# Patient Record
Sex: MALE | Race: BLACK OR AFRICAN AMERICAN | Employment: PART TIME | URBAN - METROPOLITAN AREA
[De-identification: names, ages, dates, MRNs, and addresses within clinical notes are randomized per-mention and may not be internally consistent; named-entity substitution may affect disease eponyms.]

---

## 2017-03-31 ENCOUNTER — ED HISTORICAL/CONVERTED ENCOUNTER (OUTPATIENT)
Dept: OTHER | Age: 22
End: 2017-03-31

## 2017-11-20 ENCOUNTER — ED HISTORICAL/CONVERTED ENCOUNTER (OUTPATIENT)
Dept: OTHER | Age: 22
End: 2017-11-20

## 2017-11-24 ENCOUNTER — ED HISTORICAL/CONVERTED ENCOUNTER (OUTPATIENT)
Dept: OTHER | Age: 22
End: 2017-11-24

## 2019-06-28 ENCOUNTER — ED HISTORICAL/CONVERTED ENCOUNTER (OUTPATIENT)
Dept: OTHER | Age: 24
End: 2019-06-28

## 2020-01-06 ENCOUNTER — ED HISTORICAL/CONVERTED ENCOUNTER (OUTPATIENT)
Dept: OTHER | Age: 25
End: 2020-01-06

## 2022-05-20 ENCOUNTER — HOSPITAL ENCOUNTER (EMERGENCY)
Age: 27
Discharge: HOME | End: 2022-05-20
Attending: EMERGENCY MEDICINE | Admitting: EMERGENCY MEDICINE
Payer: MEDICAID

## 2022-05-20 ENCOUNTER — APPOINTMENT (OUTPATIENT)
Dept: CT IMAGING | Age: 27
End: 2022-05-20
Attending: NURSE PRACTITIONER
Payer: MEDICAID

## 2022-05-20 VITALS
HEIGHT: 69 IN | DIASTOLIC BLOOD PRESSURE: 66 MMHG | WEIGHT: 186 LBS | OXYGEN SATURATION: 99 % | RESPIRATION RATE: 18 BRPM | HEART RATE: 63 BPM | TEMPERATURE: 98.5 F | SYSTOLIC BLOOD PRESSURE: 132 MMHG | BODY MASS INDEX: 27.55 KG/M2

## 2022-05-20 DIAGNOSIS — J35.1 ENLARGED TONSILS: ICD-10-CM

## 2022-05-20 DIAGNOSIS — K08.89 DENTALGIA: Primary | ICD-10-CM

## 2022-05-20 DIAGNOSIS — J02.9 SORE THROAT: ICD-10-CM

## 2022-05-20 LAB — DEPRECATED S PYO AG THROAT QL EIA: NEGATIVE

## 2022-05-20 PROCEDURE — 87147 CULTURE TYPE IMMUNOLOGIC: CPT

## 2022-05-20 PROCEDURE — 74011250636 HC RX REV CODE- 250/636: Performed by: NURSE PRACTITIONER

## 2022-05-20 PROCEDURE — 87070 CULTURE OTHR SPECIMN AEROBIC: CPT

## 2022-05-20 PROCEDURE — 74011000636 HC RX REV CODE- 636: Performed by: NURSE PRACTITIONER

## 2022-05-20 PROCEDURE — 87880 STREP A ASSAY W/OPTIC: CPT

## 2022-05-20 PROCEDURE — 96374 THER/PROPH/DIAG INJ IV PUSH: CPT

## 2022-05-20 PROCEDURE — 74011250637 HC RX REV CODE- 250/637: Performed by: NURSE PRACTITIONER

## 2022-05-20 PROCEDURE — 70491 CT SOFT TISSUE NECK W/DYE: CPT

## 2022-05-20 PROCEDURE — 96375 TX/PRO/DX INJ NEW DRUG ADDON: CPT

## 2022-05-20 PROCEDURE — 99285 EMERGENCY DEPT VISIT HI MDM: CPT

## 2022-05-20 RX ORDER — KETOROLAC TROMETHAMINE 15 MG/ML
15 INJECTION, SOLUTION INTRAMUSCULAR; INTRAVENOUS
Status: COMPLETED | OUTPATIENT
Start: 2022-05-20 | End: 2022-05-20

## 2022-05-20 RX ORDER — DEXAMETHASONE SODIUM PHOSPHATE 4 MG/ML
10 INJECTION, SOLUTION INTRA-ARTICULAR; INTRALESIONAL; INTRAMUSCULAR; INTRAVENOUS; SOFT TISSUE ONCE
Status: DISCONTINUED | OUTPATIENT
Start: 2022-05-20 | End: 2022-05-20

## 2022-05-20 RX ORDER — PENICILLIN V POTASSIUM 250 MG/1
500 TABLET, FILM COATED ORAL
Status: COMPLETED | OUTPATIENT
Start: 2022-05-20 | End: 2022-05-20

## 2022-05-20 RX ORDER — KETOROLAC TROMETHAMINE 30 MG/ML
30 INJECTION, SOLUTION INTRAMUSCULAR; INTRAVENOUS
Status: DISCONTINUED | OUTPATIENT
Start: 2022-05-20 | End: 2022-05-20

## 2022-05-20 RX ORDER — PENICILLIN V POTASSIUM 500 MG/1
500 TABLET, FILM COATED ORAL 4 TIMES DAILY
Qty: 40 TABLET | Refills: 0 | Status: SHIPPED | OUTPATIENT
Start: 2022-05-20 | End: 2022-05-30

## 2022-05-20 RX ORDER — DEXAMETHASONE SODIUM PHOSPHATE 4 MG/ML
8 INJECTION, SOLUTION INTRA-ARTICULAR; INTRALESIONAL; INTRAMUSCULAR; INTRAVENOUS; SOFT TISSUE ONCE
Status: COMPLETED | OUTPATIENT
Start: 2022-05-20 | End: 2022-05-20

## 2022-05-20 RX ORDER — KETOROLAC TROMETHAMINE 10 MG/1
10 TABLET, FILM COATED ORAL
Qty: 20 TABLET | Refills: 0 | Status: SHIPPED | OUTPATIENT
Start: 2022-05-20 | End: 2022-05-25

## 2022-05-20 RX ADMIN — KETOROLAC TROMETHAMINE 15 MG: 15 INJECTION, SOLUTION INTRAMUSCULAR; INTRAVENOUS at 15:40

## 2022-05-20 RX ADMIN — PENICILLIN V POTASSIUM 500 MG: 250 TABLET, FILM COATED ORAL at 17:05

## 2022-05-20 RX ADMIN — IOPAMIDOL 100 ML: 755 INJECTION, SOLUTION INTRAVENOUS at 16:14

## 2022-05-20 RX ADMIN — DEXAMETHASONE SODIUM PHOSPHATE 8 MG: 4 INJECTION, SOLUTION INTRAMUSCULAR; INTRAVENOUS at 15:23

## 2022-05-20 NOTE — ED PROVIDER NOTES
EMERGENCY DEPARTMENT HISTORY AND PHYSICAL EXAM      Date: 5/20/2022  Patient Name: Gerry Suarez    History of Presenting Illness     Chief Complaint   Patient presents with    Sore Throat       History Provided By: Patient    HPI: Gerry Suarez, 27 y.o. male with a past medical history significant No significant past medical history presents to the ED with cc of right lower rear dental pain, right sided throat pain and neck swelling and pain progressively worsening over the last 7 days. Initial site of pain was his throat with subsequent development of pain in other areas mentioned. Not consistent with previous episodes of strep throat. Denies any fever, chills, shortness of breath, inability to swallow, subjective feeling of change in voice quality. He denies any other symptoms, ear pain, chest pain, abdominal pain, nausea vomiting or diarrhea. Pain is exacerbated by swallowing, food textures or temperatures and there are no alleviating factors identified or attempted. Denies any recent illnesses or antibiotic use. There are no other complaints, changes, or physical findings at this time. PCP: None    No current facility-administered medications on file prior to encounter. No current outpatient medications on file prior to encounter. Past History     Past Medical History:  History reviewed. No pertinent past medical history. Past Surgical History:  History reviewed. No pertinent surgical history. Family History:  History reviewed. No pertinent family history. Social History:  Social History     Tobacco Use    Smoking status: Never Smoker    Smokeless tobacco: Never Used   Substance Use Topics    Alcohol use: Not on file    Drug use: Not on file       Allergies:  No Known Allergies      Review of Systems     Review of Systems   Constitutional: Negative. Negative for appetite change, chills and fever.    HENT: Positive for dental problem, facial swelling, sore throat and trouble swallowing. Negative for drooling, ear pain, mouth sores, nosebleeds and voice change. Eyes: Negative. Respiratory: Negative. Negative for cough, choking and shortness of breath. Cardiovascular: Negative. Gastrointestinal: Negative. Genitourinary: Negative. Musculoskeletal: Negative. Skin: Negative. Neurological: Negative. Hematological: Positive for adenopathy. Psychiatric/Behavioral: Negative. All other systems reviewed and are negative. Physical Exam     Physical Exam  Vitals and nursing note reviewed. Constitutional:       General: He is not in acute distress. Appearance: He is well-developed and normal weight. He is not ill-appearing, toxic-appearing or diaphoretic. HENT:      Head: Normocephalic and atraumatic. Jaw: There is normal jaw occlusion. Tenderness and swelling present. No trismus. Right Ear: Tympanic membrane and ear canal normal.      Left Ear: Tympanic membrane and ear canal normal.      Nose: No congestion. Mouth/Throat:      Lips: Pink. Mouth: Mucous membranes are moist. No oral lesions. Dentition: Dental tenderness and dental caries present. Tongue: Tongue does not deviate from midline. Palate: No mass and lesions. Pharynx: Uvula midline. Pharyngeal swelling and posterior oropharyngeal erythema present. No oropharyngeal exudate or uvula swelling. Tonsils: No tonsillar exudate or tonsillar abscesses. 2+ on the right. 0 on the left. Eyes:      Conjunctiva/sclera: Conjunctivae normal.   Cardiovascular:      Rate and Rhythm: Normal rate. Heart sounds: Normal heart sounds. No murmur heard. Pulmonary:      Effort: Pulmonary effort is normal. No respiratory distress. Breath sounds: Normal breath sounds. Abdominal:      Palpations: Abdomen is soft. Musculoskeletal:      Cervical back: Normal range of motion and neck supple. Skin:     General: Skin is warm and dry.       Capillary Refill: Capillary refill takes less than 2 seconds. Neurological:      General: No focal deficit present. Mental Status: He is alert and oriented to person, place, and time. Lab and Diagnostic Study Results     Labs -  Labs Reviewed   STREP AG SCREEN, GROUP A   CULTURE, THROAT       Radiologic Studies -     CT NECK SOFT TISSUE W CONT   Final Result   No evidence of explain right-sided throat pain            Medical Decision Making   - I am the first provider for this patient. - I reviewed the vital signs, available nursing notes, past medical history, past surgical history, family history and social history. - Initial assessment performed. The patients presenting problems have been discussed, and they are in agreement with the care plan formulated and outlined with them. I have encouraged them to ask questions as they arise throughout their visit. Vital Signs-Reviewed the patient's vital signs. Patient Vitals for the past 12 hrs:   Temp Pulse Resp BP SpO2   05/20/22 1359 98.5 °F (36.9 °C) 63 18 132/66 99 %       Records Reviewed: Nursing Notes and Old Medical Records    The patient presents with throat and dental pain with a differential diagnosis of dental abscess, peritonsillar abscess, viral pharyngitis, strep pharyngitis, Blayne's angina, dentalgia, dental caries, adenopathy      ED Course:     ED Course as of 05/23/22 1340   Fri May 20, 2022   132 27year-old generally healthy male with no chronic medical problems presents with 7-day history of progressively worsening right-sided throat pain dental pain and subjective swelling and pain to right side of her neck and jaw. There are no red flag findings of trismus, jaw swelling without ability to palpate mandible, no cervical lymph node swelling but is painful to palpation, no airway impingement and only right-sided tonsils are swollen.   No evidence of clinical sepsis as he is afebrile with no history of fever, no tachycardia, no systemic symptoms. Will treat with anti-inflammatories, Decadron and CT of the neck with contrast for any evidence of abscess dental or peritonsillar. No lesions or exudate but will check for strep panel. Plan for treatment with Centor criteria met. Physical examination findings with antibiotics follow-up as outpatient with ENT or oral surgery depending upon findings on diagnostic studies. [KR]   1546 STREP AG SCREEN, GROUP A:    Group A Strep Ag ID Negative [KR]   1644 Feeling improvement in pain in throat and neck. [KR]   Imaging without evidence of peritonsillar abscess or acute airway impingement. He has been able to tolerate p.o. and has swallowed with less pain. Agreeable with proceeding with discharge plan previously established with antibiotics and outpatient follow-up. PCN appropriate for both prophylactic treatment for Centor criteria as well as dentalgia. Education provided on signs and symptoms to return for she may develop requiring emergent intervention. ED Course User Index  [KR] Nimo Santos NP       Provider Notes (Medical Decision Making):     MDM  Number of Diagnoses or Management Options  Dentalgia: new, no workup  Enlarged tonsils: new, needed workup  Sore throat: new, needed workup     Amount and/or Complexity of Data Reviewed  Clinical lab tests: ordered and reviewed  Tests in the radiology section of CPT®: ordered and reviewed  Discuss the patient with other providers: yes  Independent visualization of images, tracings, or specimens: yes    Risk of Complications, Morbidity, and/or Mortality  Presenting problems: low    Patient Progress  Patient progress: improved         Disposition   Disposition: Condition stable and improved  DC- Adult Discharges: All of the diagnostic tests were reviewed and questions answered. Diagnosis, care plan and treatment options were discussed. The patient understands the instructions and will follow up as directed.  The patients results have been reviewed with them.  They have been counseled regarding their diagnosis. The patient verbally convey understanding and agreement of the signs, symptoms, diagnosis, treatment and prognosis and additionally agrees to follow up as recommended with their PCP in 24 - 48 hours. They also agree with the care-plan and convey that all of their questions have been answered. I have also put together some discharge instructions for them that include: 1) educational information regarding their diagnosis, 2) how to care for their diagnosis at home, as well a 3) list of reasons why they would want to return to the ED prior to their follow-up appointment, should their condition change. DISCHARGE PLAN:  1. There are no discharge medications for this patient. 2.   Follow-up Information     Follow up With Specialties Details Why [address] [place] [address] Dentistry Schedule an appointment as soon as possible for a visit   [address] [address]  [phone]    [place] [address]  Schedule an appointment as soon as possible for a visit  PCP and dental referral [address] [place] [address]  [phone]    Follow up with primary care, urgent care, or this Emergency department   As needed, If symptoms worsen         3. Return to ED if worse   4. Discharge Medication List as of 5/20/2022  5:44 PM      START taking these medications    Details   ketorolac (TORADOL) 10 mg tablet Take 1 Tablet by mouth every six (6) hours as needed for Pain for up to 5 days. , Normal, Disp-20 Tablet, R-0      penicillin v potassium (VEETID) 500 mg tablet Take 1 Tablet by mouth four (4) times daily for 10 days. , Normal, Disp-40 Tablet, R-0               Diagnosis     Clinical Impression:   1. Dentalgia    2. Enlarged tonsils    3. Sore throat        Attestations:    Jason Kessler NP    Please note that this dictation was completed with [employer], the [employer] voice recognition software.   Quite often unanticipated grammatical, syntax, homophones, and other interpretive errors are inadvertently transcribed by the computer software. Please disregard these errors. Please excuse any errors that have escaped final proofreading. Thank you.

## 2022-05-20 NOTE — Clinical Note
[address] [facility] [place] EMERGENCY DEPARTMENT  [address] [facility] [address]  [phone]    Work/School Note    Date: 5/20/2022    To Whom It May concern:    Racheal Tucker was seen and treated today in the emergency room by the following provider(s):  Nurse Practitioner: Amanda Grace NP. Racheal Tucker is excused from work/school on 05/20/22 and 05/21/22. He is medically clear to return to work/school on 5/22/2022.        Sincerely,          Neville Durham NP

## 2022-05-24 LAB
BACTERIA SPEC CULT: NORMAL
BACTERIA SPEC CULT: NORMAL
SPECIAL REQUESTS,SREQ: NORMAL

## 2022-06-28 ENCOUNTER — HOSPITAL ENCOUNTER (EMERGENCY)
Age: 27
Discharge: HOME | End: 2022-06-28
Payer: MEDICAID

## 2022-06-28 VITALS
OXYGEN SATURATION: 100 % | DIASTOLIC BLOOD PRESSURE: 84 MMHG | BODY MASS INDEX: 27.11 KG/M2 | WEIGHT: 183 LBS | TEMPERATURE: 97.6 F | HEART RATE: 62 BPM | SYSTOLIC BLOOD PRESSURE: 140 MMHG | HEIGHT: 69 IN | RESPIRATION RATE: 18 BRPM

## 2022-06-28 DIAGNOSIS — K02.9 DENTAL DECAY: Primary | ICD-10-CM

## 2022-06-28 PROCEDURE — 74011250637 HC RX REV CODE- 250/637: Performed by: EMERGENCY MEDICINE

## 2022-06-28 PROCEDURE — 74011000250 HC RX REV CODE- 250: Performed by: EMERGENCY MEDICINE

## 2022-06-28 PROCEDURE — 99283 EMERGENCY DEPT VISIT LOW MDM: CPT

## 2022-06-28 RX ORDER — IBUPROFEN 800 MG/1
800 TABLET ORAL ONCE
Status: COMPLETED | OUTPATIENT
Start: 2022-06-28 | End: 2022-06-28

## 2022-06-28 RX ORDER — IBUPROFEN 800 MG/1
800 TABLET ORAL
Qty: 20 TABLET | Refills: 0 | Status: SHIPPED | OUTPATIENT
Start: 2022-06-28 | End: 2022-07-05

## 2022-06-28 RX ORDER — AMOXICILLIN 500 MG/1
500 CAPSULE ORAL ONCE
Status: COMPLETED | OUTPATIENT
Start: 2022-06-28 | End: 2022-06-28

## 2022-06-28 RX ORDER — LIDOCAINE HYDROCHLORIDE 20 MG/ML
15 SOLUTION OROPHARYNGEAL ONCE
Status: COMPLETED | OUTPATIENT
Start: 2022-06-28 | End: 2022-06-28

## 2022-06-28 RX ORDER — AMOXICILLIN 500 MG/1
500 TABLET, FILM COATED ORAL 3 TIMES DAILY
Qty: 21 TABLET | Refills: 0 | Status: SHIPPED | OUTPATIENT
Start: 2022-06-28 | End: 2022-07-05

## 2022-06-28 RX ORDER — ACETAMINOPHEN 500 MG
1000 TABLET ORAL ONCE
Status: COMPLETED | OUTPATIENT
Start: 2022-06-28 | End: 2022-06-28

## 2022-06-28 RX ADMIN — IBUPROFEN 800 MG: 800 TABLET, FILM COATED ORAL at 19:50

## 2022-06-28 RX ADMIN — AMOXICILLIN 500 MG: 500 CAPSULE ORAL at 19:50

## 2022-06-28 RX ADMIN — LIDOCAINE HYDROCHLORIDE 15 ML: 20 SOLUTION ORAL; TOPICAL at 19:51

## 2022-06-28 RX ADMIN — ACETAMINOPHEN 1000 MG: 500 TABLET ORAL at 19:50

## 2022-06-28 NOTE — ED PROVIDER NOTES
EMERGENCY DEPARTMENT HISTORY AND PHYSICAL EXAM      Date: 6/28/2022  Patient Name: Chiquita Singh    History of Presenting Illness     Chief Complaint   Patient presents with    Dental Pain       History Provided By: Patient    HPI: Chiquita Singh, 27 y.o. male   presents to the ED with cc of dental pain. Patient complains of dental pain for last several days. Pain has been constant and mild to moderate degree without obvious aggravating relieving factors. No OTC treatment. No injury. No difficulty swallowing or change in voice. No fever chills. Patient complains of mild swelling of the right side of the face. PCP: None    No current facility-administered medications on file prior to encounter. No current outpatient medications on file prior to encounter. Past History     Past Medical History:  History reviewed. No pertinent past medical history. Past Surgical History:  Past Surgical History:   Procedure Laterality Date    HX HERNIA REPAIR  2020       Family History:  History reviewed. No pertinent family history. Social History:  Social History     Tobacco Use    Smoking status: Never Smoker    Smokeless tobacco: Never Used   Substance Use Topics    Alcohol use: Yes     Comment: occ    Drug use: Not Currently       Allergies:  No Known Allergies      Review of Systems   Review of Systems   Constitutional: Negative for chills and fever. HENT: Negative for sore throat. Eyes: Negative for discharge. Respiratory: Negative for cough. Neurological: Negative for headaches. Physical Exam   Physical Exam  Vitals and nursing note reviewed. Constitutional:       General: He is not in acute distress. Appearance: Normal appearance. He is not ill-appearing, toxic-appearing or diaphoretic. HENT:      Head: Normocephalic and atraumatic.       Mouth/Throat:      Mouth: Mucous membranes are moist.      Comments: Mild dental decay of the right lower molar with a mild gum swelling. No appreciable facial swelling. Normal voice. No stridor. Eyes:      Conjunctiva/sclera: Conjunctivae normal.   Pulmonary:      Effort: Pulmonary effort is normal.   Musculoskeletal:      Cervical back: Neck supple. Skin:     General: Skin is warm and dry. Neurological:      General: No focal deficit present. Mental Status: He is alert. Psychiatric:         Behavior: Behavior normal.         Diagnostic Study Results     Labs -   No results found for this or any previous visit (from the past 12 hour(s)). Radiologic Studies -   No orders to display     CT Results  (Last 48 hours)    None        CXR Results  (Last 48 hours)    None            Medical Decision Making   I am the first provider for this patient. I reviewed the vital signs, available nursing notes, past medical history, past surgical history, family history and social history. Vital Signs-Reviewed the patient's vital signs. Patient Vitals for the past 12 hrs:   Temp Pulse Resp BP SpO2   06/28/22 1935 97.6 °F (36.4 °C) 62 18 (!) 140/84 100 %       Records Reviewed:     Provider Notes (Medical Decision Making):       ED Course:   Initial assessment performed. The patients presenting problems have been discussed, and they are in agreement with the care plan formulated and outlined with them. I have encouraged them to ask questions as they arise throughout their visit. PROCEDURES      Disposition: Condition stable   DC- Adult Discharges: All of the diagnostic tests were reviewed and questions answered. Diagnosis, care plan and treatment options were discussed. understand instructions and will follow up as directed. The patients results have been reviewed with them. They have been counseled regarding their diagnosis. The patient verbally convey understanding and agreement of the signs, symptoms, diagnosis, treatment and prognosis and additionally agrees to follow up as recommended.   They also agree with the care-plan and convey that all of their questions have been answered. I have also put together some discharge instructions for them that include: 1) educational information regarding their diagnosis, 2) how to care for their diagnosis at home, as well a 3) list of reasons why they would want to return to the ED prior to their follow-up appointment, should their condition change. PLAN:  1. Discharge Medication List as of 6/28/2022  7:46 PM      START taking these medications    Details   amoxicillin 500 mg tab Take 500 mg by mouth three (3) times daily for 7 days. , Normal, Disp-21 Tablet, R-0      ibuprofen (MOTRIN) 800 mg tablet Take 1 Tablet by mouth every eight (8) hours as needed for Pain for up to 7 days. , Normal, Disp-20 Tablet, R-0           2. Follow-up Information     Follow up With Specialties Details Why Contact Info    Follow-up with a dentist of your choice  Schedule an appointment as soon as possible for a visit           Return to ED if worse     Diagnosis     Clinical Impression:   1. Dental decay        Please note that this dictation was completed with [employer], the computer voice recognition software. Quite often unanticipated grammatical, syntax, homophones, and other interpretive errors are inadvertently transcribed by the computer software. Please disregard these errors. Please excuse any errors that have escaped final proofreading. Thank you.

## 2022-06-28 NOTE — ED TRIAGE NOTES
Dental pain for some time now. Bottom right. [facility] has made a dentist appt, but not until October.

## 2022-09-12 ENCOUNTER — HOSPITAL ENCOUNTER (EMERGENCY)
Age: 27
Discharge: HOME | End: 2022-09-12
Attending: FAMILY MEDICINE
Payer: MEDICAID

## 2022-09-12 VITALS
HEART RATE: 63 BPM | BODY MASS INDEX: 27.11 KG/M2 | HEIGHT: 69 IN | TEMPERATURE: 97.6 F | RESPIRATION RATE: 18 BRPM | SYSTOLIC BLOOD PRESSURE: 121 MMHG | DIASTOLIC BLOOD PRESSURE: 73 MMHG | WEIGHT: 183 LBS | OXYGEN SATURATION: 99 %

## 2022-09-12 DIAGNOSIS — K04.7 DENTAL INFECTION: Primary | ICD-10-CM

## 2022-09-12 PROCEDURE — 99283 EMERGENCY DEPT VISIT LOW MDM: CPT

## 2022-09-12 RX ORDER — PENICILLIN V POTASSIUM 500 MG/1
500 TABLET, FILM COATED ORAL 4 TIMES DAILY
Qty: 28 TABLET | Refills: 0 | Status: SHIPPED | OUTPATIENT
Start: 2022-09-12 | End: 2022-09-19

## 2022-09-12 RX ORDER — IBUPROFEN 800 MG/1
800 TABLET ORAL
Qty: 15 TABLET | Refills: 0 | Status: SHIPPED | OUTPATIENT
Start: 2022-09-12 | End: 2022-09-17

## 2022-09-12 NOTE — ED PROVIDER NOTES
EMERGENCY DEPARTMENT HISTORY AND PHYSICAL EXAM      Date: (Not on file)  Patient Name: Krystal Johnson    History of Presenting Illness     Chief Complaint   Patient presents with    Dental Pain       History Provided By:     HPI: Krystal Johnson, is a very pleasant 32 y.o. male presenting to the ED with a chief complaint of dental pain. Recent onset of symptoms. Patient states the right lower teeth have been achy and painful. No radiation of symptoms. No alleviating or aggravating variables. No fevers chills rigors. No difficulty swallowing. No swelling under tongue. The patient denies any other symptoms at this time. PCP: None    No current facility-administered medications on file prior to encounter. No current outpatient medications on file prior to encounter. Past History     Past Medical History:  History reviewed. No pertinent past medical history. Past Surgical History:  Past Surgical History:   Procedure Laterality Date    HX HERNIA REPAIR  2020       Family History:  History reviewed. No pertinent family history. Social History:  Social History     Tobacco Use    Smoking status: Never    Smokeless tobacco: Never   Substance Use Topics    Alcohol use: Yes     Comment: occ    Drug use: Not Currently       Allergies:  No Known Allergies      Review of Systems       Constitutional:  Negative for activity change, appetite change, chills, fatigue and fever. HENT:  Negative for congestion and sore throat. No difficulty swallowing.  + Dental pain   Eyes:  Negative for photophobia and visual disturbance. Respiratory:  Negative for cough, shortness of breath and wheezing. Cardiovascular:  Negative for chest pain, palpitations and leg swelling. Gastrointestinal:  Negative for abdominal pain, diarrhea, nausea and vomiting. Endocrine: Negative for cold intolerance and heat intolerance. Musculoskeletal:  Negative for gait problem and joint swelling.    Skin:  Negative for color change and rash. Neurological:  Negative for dizziness and headaches. Physical Exam     Physical Exam  Constitutional:       Appearance: She is well-developed. HENT:      Head: Normocephalic and atraumatic. Mouth/Throat:      Mouth: Mucous membranes are moist. + minimal erythema of right lower gumline. No palpable fluctuant abscess. No mandibular nor maxillary tenderness with palpation. Posterior oropharynx is not  erythematous, no tonsillar swelling or exudate. Uvula is midline. No swelling of tongue. No swelling under tongue. Patient is tolerating secretions without difficulty. No muffled voice. Eyes:      Conjunctiva/sclera: Conjunctivae normal.      Pupils: Pupils are equal, round, and reactive to light. Cardiovascular:      Rate and Rhythm: Normal rate and regular rhythm. Pulses: Normal pulses. Heart sounds: Normal heart sounds. No murmur heard. Pulmonary:      Effort: No respiratory distress. Breath sounds: No stridor. No wheezing, rhonchi or rales. Abdominal:      General: There is no distension. Tenderness: There is no abdominal tenderness. There is no rebound. Musculoskeletal:      Cervical back: Normal range of motion and neck supple. Skin:     General: Skin is warm and dry. Neurological:      General: No focal deficit present. Mental Status: She is alert and oriented to person, place, and time. Psychiatric:         Mood and Affect: Mood normal.         Behavior: Behavior normal.     Lab and Diagnostic Study Results     Labs -   No results found for this or any previous visit (from the past 12 hour(s)). Radiologic Studies -   @lastxrresult@  CT Results  (Last 48 hours)      None          CXR Results  (Last 48 hours)      None              Medical Decision Making   - I am the first provider for this patient.   - I reviewed the vital signs, available nursing notes, past medical history, past surgical history, family history and social history. - Initial assessment performed. The patients presenting problems have been discussed, and they are in agreement with the care plan formulated and outlined with them. I have encouraged them to ask questions as they arise throughout their visit. Vital Signs-Reviewed the patient's vital signs. Patient Vitals for the past 12 hrs:   Temp Pulse Resp BP SpO2   09/12/22 1718 97.6 °F (36.4 °C) 63 18 121/73 99 %         ED Course/ Provider Notes (Medical Decision Making):     Patient presented to the emergency department with the aforementioned chief complaint. On examination the patient is nontoxic. Vitals were reviewed per above. No findings consistent with dental abscess. History and exam findings consistent with likely early dental infection. Antibiotics prescribed. Information to follow-up with dentist.    Franchesca Colindres was given a thorough list of signs and symptoms that would warrant an immediate return to the emergency department. Otherwise Franchesca Colindres will follow up with PCP and dentist.       Procedures   Medical Decision Makingedical Decision Making  Performed by: Lyman Seip, DO  Procedures  None       Disposition   Disposition:     Home     All of the diagnostic tests were reviewed and questions answered. Diagnosis, care plan and treatment options were discussed. The patient understands the instructions and will follow up as directed. The patients results have been reviewed with them. They have been counseled regarding their diagnosis. The patient verbally convey understanding and agreement of the signs, symptoms, diagnosis, treatment and prognosis and additionally agrees to follow up as recommended with their PCP in 24 - 48 hours. They also agree with the care-plan and convey that all of their questions have been answered.   I have also put together some discharge instructions for them that include: 1) educational information regarding their diagnosis, 2) how to care for their diagnosis at home, as well a 3) list of reasons why they would want to return to the ED prior to their follow-up appointment, should their condition change. DISCHARGE PLAN:    1. Current Discharge Medication List        START taking these medications    Details   penicillin v potassium (VEETID) 500 mg tablet Take 1 Tablet by mouth four (4) times daily for 7 days. Qty: 28 Tablet, Refills: 0      ibuprofen (MOTRIN) 800 mg tablet Take 1 Tablet by mouth every eight (8) hours as needed for Pain for up to 5 days. Qty: 15 Tablet, Refills: 0               2.   Follow-up Information       Follow up With Specialties Details Why Contact Info    Your primary care doctor  Schedule an appointment as soon as possible for a visit in 2 days      [address] [facility] Call   [facility] [facility]  [phone]              3.  Return to ED if worse       4. Current Discharge Medication List        START taking these medications    Details   penicillin v potassium (VEETID) 500 mg tablet Take 1 Tablet by mouth four (4) times daily for 7 days. Qty: 28 Tablet, Refills: 0  Start date: 9/12/2022, End date: 9/19/2022      ibuprofen (MOTRIN) 800 mg tablet Take 1 Tablet by mouth every eight (8) hours as needed for Pain for up to 5 days. Qty: 15 Tablet, Refills: 0  Start date: 9/12/2022, End date: 9/17/2022               Diagnosis     Clinical Impression:    1. Dental infection        Attestations:    Kia Ross, DO    Please note that this dictation was completed with [email or web address], the computer voice recognition software. Quite often unanticipated grammatical, syntax, homophones, and other interpretive errors are inadvertently transcribed by the computer software. Please disregard these errors. Please excuse any errors that have escaped final proofreading. Thank you.

## 2022-09-12 NOTE — Clinical Note
[address]  [address] [facility] [address]  [phone]    Work/School Note    Date: 9/12/2022    To Whom It May concern:      Madiha Pagan was seen and treated today in the emergency room by the following provider(s):  Attending Provider: Makenna See is excused from work/school on 09/12/22. He is clear to return to work/school on 09/13/22.         Sincerely,          Duke Jones, DO

## 2022-10-10 ENCOUNTER — HOSPITAL ENCOUNTER (EMERGENCY)
Age: 27
Discharge: HOME | End: 2022-10-10
Attending: EMERGENCY MEDICINE
Payer: MEDICAID

## 2022-10-10 VITALS
BODY MASS INDEX: 27.4 KG/M2 | TEMPERATURE: 98.2 F | WEIGHT: 185 LBS | HEART RATE: 57 BPM | SYSTOLIC BLOOD PRESSURE: 133 MMHG | HEIGHT: 69 IN | DIASTOLIC BLOOD PRESSURE: 76 MMHG | OXYGEN SATURATION: 100 % | RESPIRATION RATE: 18 BRPM

## 2022-10-10 DIAGNOSIS — K12.1 STOMATITIS AND MUCOSITIS: Primary | ICD-10-CM

## 2022-10-10 DIAGNOSIS — K12.30 STOMATITIS AND MUCOSITIS: Primary | ICD-10-CM

## 2022-10-10 PROCEDURE — 99283 EMERGENCY DEPT VISIT LOW MDM: CPT

## 2022-10-10 PROCEDURE — 74011000250 HC RX REV CODE- 250: Performed by: EMERGENCY MEDICINE

## 2022-10-10 RX ORDER — LIDOCAINE HYDROCHLORIDE 20 MG/ML
15 SOLUTION OROPHARYNGEAL AS NEEDED
Status: DISCONTINUED | OUTPATIENT
Start: 2022-10-10 | End: 2022-10-10 | Stop reason: HOSPADM

## 2022-10-10 RX ADMIN — LIDOCAINE HYDROCHLORIDE 15 ML: 20 SOLUTION ORAL; TOPICAL at 18:05

## 2022-10-10 NOTE — Clinical Note
[address] [place] [id number]  [phone]    Work/School Note    Date: 10/10/2022    To Whom It May concern:    Ricco Lazar was seen and treated today in the emergency room by the following provider(s):  Attending Provider: Carol Gillette MD.      Ricco Lazar is excused from work/school on 10/10/22 and 10/11/22. He is medically clear to return to work/school on 10/12/2022.        Sincerely,          Makenna Grey MD

## 2022-10-10 NOTE — ED NOTES
Pt reporting prescription medication not received by [facility] pharmacy. Called pharmacy and gave verbal order for the medication to be received. Called pt back and left a message on his voicemail informing him that he should be able to get the medication now.

## 2022-10-10 NOTE — ED TRIAGE NOTES
Pt had some dental work done on Wednesday. Thursday am he noticed some kind of \"rash\" inside his mouth. Worried about having Herpes - Hurts to move mouth and try to eat or drink.

## 2022-10-10 NOTE — ED PROVIDER NOTES
EMERGENCY DEPARTMENT HISTORY AND PHYSICAL EXAM      Date: 10/10/2022  Patient Name: Lynn Tracy    History of Presenting Illness     Chief Complaint   Patient presents with    Rash       History Provided By:   Patient  HPI: Lynn Tracy, 32 y.o. male with a past medical history significant No significant past medical history presents to the ED with cc of rash in his mouth. Patient developed painful lesions on the mucosa of his mouth on Thursday. On Wednesday he had dental extractions done at [facility]. He has never headache like this previously. No history of herpes or cold sores. States the pain is making it difficult to eat and drink. He has tried Tylenol and ibuprofen with minimal relief. There are no other complaints, changes, or physical findings at this time. PCP: None    No current facility-administered medications on file prior to encounter. No current outpatient medications on file prior to encounter. Past History     Past Medical History:  History reviewed. No pertinent past medical history. Past Surgical History:  Past Surgical History:   Procedure Laterality Date    HX HERNIA REPAIR  2020       Family History:  History reviewed. No pertinent family history. Social History:  Social History     Tobacco Use    Smoking status: Never    Smokeless tobacco: Never   Substance Use Topics    Alcohol use: Yes     Comment: occ    Drug use: Not Currently       Allergies:  No Known Allergies      Review of Systems     Review of Systems   Constitutional:  Negative for activity change, appetite change and fever. HENT:  Positive for mouth sores. Negative for rhinorrhea and sore throat. Respiratory:  Negative for cough and shortness of breath. Cardiovascular:  Negative for chest pain. Gastrointestinal:  Negative for abdominal pain, diarrhea and nausea. Musculoskeletal:  Negative for arthralgias and myalgias. Neurological:  Negative for headaches.    All other systems reviewed and are negative. Physical Exam     Physical Exam  Vitals and nursing note reviewed. Constitutional:       General: He is not in acute distress. Appearance: Normal appearance. HENT:      Head: Normocephalic and atraumatic. Mouth/Throat:      Comments: Tender mouth sores on the left lower lip, under the tongue and the lateral right lower jaw. No clusters, no focal abscess   Eyes:      Pupils: Pupils are equal, round, and reactive to light. Cardiovascular:      Rate and Rhythm: Normal rate and regular rhythm. Pulses: Normal pulses. Pulmonary:      Effort: Pulmonary effort is normal.   Musculoskeletal:         General: No swelling or deformity. Skin:     Coloration: Skin is not pale. Findings: No rash. Neurological:      General: No focal deficit present. Mental Status: He is alert and oriented to person, place, and time. Psychiatric:         Mood and Affect: Mood normal.         Behavior: Behavior normal.       Lab and Diagnostic Study Results     Labs -   No results found for this or any previous visit (from the past 12 hour(s)). Radiologic Studies -   @lastxrresult@  CT Results  (Last 48 hours)      None          CXR Results  (Last 48 hours)      None              Medical Decision Making   - I am the first provider for this patient. - I reviewed the vital signs, available nursing notes, past medical history, past surgical history, family history and social history. - Initial assessment performed. The patients presenting problems have been discussed, and they are in agreement with the care plan formulated and outlined with them. I have encouraged them to ask questions as they arise throughout their visit. Vital Signs-Reviewed the patient's vital signs.   Patient Vitals for the past 12 hrs:   Temp Pulse Resp BP SpO2   10/10/22 1714 98.2 °F (36.8 °C) -- -- -- --   10/10/22 1711 -- (!) 57 18 133/76 100 %       Records Reviewed: Nursing Notes      ED Course:     ED Course as of 10/10/22 1808   Mon Oct 10, 2022   256 20-year-old male presents for evaluation of painful sores in his mouth. Had dental work done on Wednesday. On Thursday he developed these painful lesions. Has 1 on the inside of his lip 1 on the external aspect of his gum and one under his tongue. He has tried Tylenol ibuprofen without any improvement. On exam has stomatitis. No clustered vesicular lesions to suggest herpes infection. We will treat symptomatically with Magic mouthwash and have patient call [facility] and follow-up tomorrow. No viral prodrome to suggest viral syndrome including monkeypox. Discharged home. [LW]      ED Course User Index  [LW] Matteo Hart MD       Disposition   Disposition: DC- Adult Discharges: All of the diagnostic tests were reviewed and questions answered. Diagnosis, care plan and treatment options were discussed. The patient understands the instructions and will follow up as directed. The patients results have been reviewed with them. They have been counseled regarding their diagnosis. The patient verbally convey understanding and agreement of the signs, symptoms, diagnosis, treatment and prognosis and additionally agrees to follow up as recommended with their PCP in 24 - 48 hours. They also agree with the care-plan and convey that all of their questions have been answered. I have also put together some discharge instructions for them that include: 1) educational information regarding their diagnosis, 2) how to care for their diagnosis at home, as well a 3) list of reasons why they would want to return to the ED prior to their follow-up appointment, should their condition change. Discharged    DISCHARGE PLAN:  1.    Current Discharge Medication List        START taking these medications    Details   aluminum-magnesium hydroxide 200-200 mg/5 mL susp 5 mL, diphenhydrAMINE 12.5 mg/5 mL liqd 12.5 mg, lidocaine 2 % soln 5 mL 5 mL by Swish and Spit route three (3) times daily for 3 days. Magic mouth wash   Maalox  Lidocaine 2% viscous   Diphenhydramine oral solution     Pharmacy to mix equal portions of ingredients to a total volume as indicated in the dispense amount. Qty: 45 mL, Refills: 0           2. Follow-up Information       Follow up With Specialties Details Why Contact Info    [address] [facility]  As needed [address] [facility] [address]  [address] [facility] [address]  In 1 day  [address]  [phone]          3. Return to ED if worse   4. Current Discharge Medication List        START taking these medications    Details   aluminum-magnesium hydroxide 200-200 mg/5 mL susp 5 mL, diphenhydrAMINE 12.5 mg/5 mL liqd 12.5 mg, lidocaine 2 % soln 5 mL 5 mL by Swish and Spit route three (3) times daily for 3 days. Magic mouth wash   Maalox  Lidocaine 2% viscous   Diphenhydramine oral solution     Pharmacy to mix equal portions of ingredients to a total volume as indicated in the dispense amount. Qty: 45 mL, Refills: 0  Start date: 10/10/2022, End date: 10/13/2022               Diagnosis     Clinical Impression:   1. Stomatitis and mucositis        Attestations:    Amelia Santiago MD    Please note that this dictation was completed with Datto, the computer voice recognition software. Quite often unanticipated grammatical, syntax, homophones, and other interpretive errors are inadvertently transcribed by the computer software. Please disregard these errors. Please excuse any errors that have escaped final proofreading. Thank you.

## 2023-07-10 ENCOUNTER — HOSPITAL ENCOUNTER (EMERGENCY)
Facility: HOSPITAL | Age: 28
Discharge: HOME | End: 2023-07-10
Payer: COMMERCIAL

## 2023-07-10 VITALS
SYSTOLIC BLOOD PRESSURE: 119 MMHG | DIASTOLIC BLOOD PRESSURE: 82 MMHG | RESPIRATION RATE: 18 BRPM | HEART RATE: 57 BPM | BODY MASS INDEX: 27.4 KG/M2 | OXYGEN SATURATION: 100 % | TEMPERATURE: 97.9 F | WEIGHT: 185 LBS | HEIGHT: 69 IN

## 2023-07-10 DIAGNOSIS — B35.3 TINEA PEDIS OF BOTH FEET: Primary | ICD-10-CM

## 2023-07-10 DIAGNOSIS — A64 STI (SEXUALLY TRANSMITTED INFECTION): ICD-10-CM

## 2023-07-10 LAB
AMORPH CRY URNS QL MICRO: ABNORMAL
APPEARANCE UR: ABNORMAL
BACTERIA URNS QL MICRO: NEGATIVE /HPF
BILIRUB UR QL: NEGATIVE
COLOR UR: ABNORMAL
EPITH CASTS URNS QL MICRO: ABNORMAL /LPF
GLUCOSE UR STRIP.AUTO-MCNC: NEGATIVE MG/DL
HGB UR QL STRIP: NEGATIVE
KETONES UR QL STRIP.AUTO: NEGATIVE MG/DL
LEUKOCYTE ESTERASE UR QL STRIP.AUTO: ABNORMAL
MUCOUS THREADS URNS QL MICRO: ABNORMAL /LPF
NITRITE UR QL STRIP.AUTO: NEGATIVE
PH UR STRIP: 7 (ref 5–8)
PROT UR STRIP-MCNC: NEGATIVE MG/DL
RBC #/AREA URNS HPF: ABNORMAL /HPF (ref 0–5)
SP GR UR REFRACTOMETRY: 1.03 (ref 1–1.03)
URINE CULTURE IF INDICATED: ABNORMAL
UROBILINOGEN UR QL STRIP.AUTO: 0.1 EU/DL (ref 0.1–1)
WBC URNS QL MICRO: ABNORMAL /HPF (ref 0–4)

## 2023-07-10 PROCEDURE — 87491 CHLMYD TRACH DNA AMP PROBE: CPT

## 2023-07-10 PROCEDURE — 96372 THER/PROPH/DIAG INJ SC/IM: CPT

## 2023-07-10 PROCEDURE — 6360000002 HC RX W HCPCS: Performed by: NURSE PRACTITIONER

## 2023-07-10 PROCEDURE — 2500000003 HC RX 250 WO HCPCS: Performed by: NURSE PRACTITIONER

## 2023-07-10 PROCEDURE — 87086 URINE CULTURE/COLONY COUNT: CPT

## 2023-07-10 PROCEDURE — 6370000000 HC RX 637 (ALT 250 FOR IP): Performed by: NURSE PRACTITIONER

## 2023-07-10 PROCEDURE — 81001 URINALYSIS AUTO W/SCOPE: CPT

## 2023-07-10 PROCEDURE — 87591 N.GONORRHOEAE DNA AMP PROB: CPT

## 2023-07-10 PROCEDURE — 99284 EMERGENCY DEPT VISIT MOD MDM: CPT

## 2023-07-10 RX ORDER — CLOTRIMAZOLE AND BETAMETHASONE DIPROPIONATE 10; .64 MG/G; MG/G
CREAM TOPICAL 2 TIMES DAILY
Qty: 15 G | Refills: 0 | Status: SHIPPED | OUTPATIENT
Start: 2023-07-10

## 2023-07-10 RX ORDER — METRONIDAZOLE 500 MG/1
2000 TABLET ORAL
Status: COMPLETED | OUTPATIENT
Start: 2023-07-10 | End: 2023-07-10

## 2023-07-10 RX ORDER — AZITHROMYCIN 500 MG/1
1000 TABLET, FILM COATED ORAL
Status: COMPLETED | OUTPATIENT
Start: 2023-07-10 | End: 2023-07-10

## 2023-07-10 RX ADMIN — LIDOCAINE HYDROCHLORIDE 500 MG: 10 INJECTION, SOLUTION EPIDURAL; INFILTRATION; INTRACAUDAL; PERINEURAL at 13:51

## 2023-07-10 RX ADMIN — METRONIDAZOLE 2000 MG: 500 TABLET ORAL at 13:57

## 2023-07-10 RX ADMIN — AZITHROMYCIN MONOHYDRATE 1000 MG: 500 TABLET ORAL at 13:51

## 2023-07-10 ASSESSMENT — LIFESTYLE VARIABLES
HOW MANY STANDARD DRINKS CONTAINING ALCOHOL DO YOU HAVE ON A TYPICAL DAY: PATIENT DOES NOT DRINK
HOW OFTEN DO YOU HAVE A DRINK CONTAINING ALCOHOL: NEVER

## 2023-07-10 NOTE — ED PROVIDER NOTES
[facility] EMERGENCY DEPT  EMERGENCY DEPARTMENT HISTORY AND PHYSICAL EXAM      Date: 7/10/2023  Patient Name: Los Aleman  MRN: [id number]  [address] [facility] 1995  Date of evaluation: 7/10/2023  Provider: MIMA Ren NP   Note Started: 1:43 PM EDT 7/10/23    HISTORY OF PRESENT ILLNESS     Chief Complaint   Patient presents with    Exposure to STD    Nail Problem       History Provided By: Patient    HPI: Los Aleman is a 32 y.o. male denies any past medical history presents to the ER for concern that he has an STD. Patient has concerns that he has an STD as he has been having dysuria x2 weeks. Patient also states he has had athlete's foot for the last few years and has been trying without success to get it to improve. PAST MEDICAL HISTORY   Past Medical History:  No past medical history on file. Past Surgical History:  Past Surgical History:   Procedure Laterality Date    HERNIA REPAIR  2020       Family History:  No family history on file. Social History:  Social History     Tobacco Use    Smoking status: Never    Smokeless tobacco: Never   Substance Use Topics    Alcohol use: Yes    Drug use: Not Currently       Allergies:  No Known Allergies    PCP: None None    Current Meds:   No current facility-administered medications for this encounter. Current Outpatient Medications   Medication Sig Dispense Refill    clotrimazole-betamethasone (LOTRISONE) 1-0.05 % cream Apply topically 2 times daily Apply topically 2 times daily.  15 g 0       Social Determinants of Health:   Social Determinants of Health     Tobacco Use: Low Risk     Smoking Tobacco Use: Never    Smokeless Tobacco Use: Never    Passive Exposure: Not on file   Alcohol Use: Not At Risk    Frequency of Alcohol Consumption: Never    Average Number of Drinks: Patient does not drink    Frequency of Binge Drinking: Never   Financial Resource Strain: Not on file   Food Insecurity: Not on file   Transportation Needs: Not on file   Physical

## 2023-07-11 LAB
C TRACH DNA SPEC QL NAA+PROBE: POSITIVE
N GONORRHOEA DNA SPEC QL NAA+PROBE: NEGATIVE
SAMPLE TYPE: ABNORMAL
SERVICE CMNT-IMP: ABNORMAL
SPECIMEN SOURCE: ABNORMAL

## 2023-07-12 LAB
BACTERIA SPEC CULT: NORMAL
Lab: NORMAL

## 2024-05-21 ENCOUNTER — HOSPITAL ENCOUNTER (EMERGENCY)
Facility: HOSPITAL | Age: 29
Discharge: HOME | End: 2024-05-21
Payer: COMMERCIAL

## 2024-05-21 VITALS
TEMPERATURE: 97.7 F | DIASTOLIC BLOOD PRESSURE: 80 MMHG | RESPIRATION RATE: 18 BRPM | OXYGEN SATURATION: 99 % | WEIGHT: 185 LBS | SYSTOLIC BLOOD PRESSURE: 121 MMHG | BODY MASS INDEX: 27.4 KG/M2 | HEART RATE: 66 BPM | HEIGHT: 69 IN

## 2024-05-21 DIAGNOSIS — M62.838 SPASM OF MUSCLE: ICD-10-CM

## 2024-05-21 DIAGNOSIS — R30.0 DYSURIA: Primary | ICD-10-CM

## 2024-05-21 LAB
AMORPH CRY URNS QL MICRO: ABNORMAL
APPEARANCE UR: ABNORMAL
BACTERIA URNS QL MICRO: NEGATIVE /HPF
BILIRUB UR QL: NEGATIVE
COLOR UR: YELLOW
EPITH CASTS URNS QL MICRO: ABNORMAL /LPF
GLUCOSE UR STRIP.AUTO-MCNC: NEGATIVE MG/DL
HGB UR QL STRIP: NEGATIVE
KETONES UR QL STRIP.AUTO: NEGATIVE MG/DL
LEUKOCYTE ESTERASE UR QL STRIP.AUTO: ABNORMAL
NITRITE UR QL STRIP.AUTO: NEGATIVE
PH UR STRIP: 8 (ref 5–8)
PROT UR STRIP-MCNC: NEGATIVE MG/DL
RBC #/AREA URNS HPF: ABNORMAL /HPF (ref 0–5)
SP GR UR REFRACTOMETRY: 1.02 (ref 1–1.03)
URINE CULTURE IF INDICATED: ABNORMAL
UROBILINOGEN UR QL STRIP.AUTO: 0.1 EU/DL (ref 0.2–1)
WBC URNS QL MICRO: ABNORMAL /HPF (ref 0–4)

## 2024-05-21 PROCEDURE — 96372 THER/PROPH/DIAG INJ SC/IM: CPT

## 2024-05-21 PROCEDURE — 99284 EMERGENCY DEPT VISIT MOD MDM: CPT

## 2024-05-21 PROCEDURE — 6370000000 HC RX 637 (ALT 250 FOR IP): Performed by: NURSE PRACTITIONER

## 2024-05-21 PROCEDURE — 87491 CHLMYD TRACH DNA AMP PROBE: CPT

## 2024-05-21 PROCEDURE — 6360000002 HC RX W HCPCS: Performed by: NURSE PRACTITIONER

## 2024-05-21 PROCEDURE — 87591 N.GONORRHOEAE DNA AMP PROB: CPT

## 2024-05-21 PROCEDURE — 81001 URINALYSIS AUTO W/SCOPE: CPT

## 2024-05-21 PROCEDURE — 2500000003 HC RX 250 WO HCPCS: Performed by: NURSE PRACTITIONER

## 2024-05-21 RX ORDER — DOXYCYCLINE HYCLATE 100 MG/1
100 CAPSULE ORAL
Status: COMPLETED | OUTPATIENT
Start: 2024-05-21 | End: 2024-05-21

## 2024-05-21 RX ORDER — DOXYCYCLINE HYCLATE 100 MG
100 TABLET ORAL 2 TIMES DAILY
Qty: 14 TABLET | Refills: 0 | Status: SHIPPED | OUTPATIENT
Start: 2024-05-21 | End: 2024-05-28

## 2024-05-21 RX ORDER — IBUPROFEN 600 MG/1
600 TABLET ORAL 3 TIMES DAILY PRN
Qty: 30 TABLET | Refills: 0 | Status: SHIPPED | OUTPATIENT
Start: 2024-05-21

## 2024-05-21 RX ADMIN — DOXYCYCLINE HYCLATE 100 MG: 100 CAPSULE ORAL at 19:11

## 2024-05-21 RX ADMIN — LIDOCAINE HYDROCHLORIDE 500 MG: 10 INJECTION, SOLUTION EPIDURAL; INFILTRATION; INTRACAUDAL; PERINEURAL at 19:12

## 2024-05-21 ASSESSMENT — LIFESTYLE VARIABLES
HOW MANY STANDARD DRINKS CONTAINING ALCOHOL DO YOU HAVE ON A TYPICAL DAY: 1 OR 2
HOW OFTEN DO YOU HAVE A DRINK CONTAINING ALCOHOL: 2-4 TIMES A MONTH

## 2024-05-21 ASSESSMENT — PAIN - FUNCTIONAL ASSESSMENT: PAIN_FUNCTIONAL_ASSESSMENT: 0-10

## 2024-05-21 ASSESSMENT — PAIN SCALES - GENERAL: PAINLEVEL_OUTOF10: 0

## 2024-05-21 NOTE — ED PROVIDER NOTES
[facility] EMERGENCY DEPT  EMERGENCY DEPARTMENT HISTORY AND PHYSICAL EXAM      Date: 5/21/2024  Patient Name: Marcel Blue  MRN: [id number]  YOB: 1995  Date of evaluation: 5/21/2024  Provider: MIMA Rodgers NP   Note Started: 6:54 PM EDT 5/21/24    HISTORY OF PRESENT ILLNESS     Chief Complaint   Patient presents with    Exposure to STD       History Provided By: Patient    HPI: Marcel Blue is a 28 y.o. male Patient requests STD testing.    Symptoms: tingling on urination  Duration: today  Discharge: clear  Dysuria: yes  Exposures to STDs: possible  History of STDs: yes  Requests prophylaxis: yes    Also c/o muscle spasm/tenseness in right hamstring after working out at the gym a few days ago.     PAST MEDICAL HISTORY   Past Medical History:  History reviewed. No pertinent past medical history.    Past Surgical History:  Past Surgical History:   Procedure Laterality Date    HERNIA REPAIR  2020       Family History:  History reviewed. No pertinent family history.    Social History:  Social History     Tobacco Use    Smoking status: Never    Smokeless tobacco: Never   Vaping Use    Vaping Use: Never used   Substance Use Topics    Alcohol use: Yes    Drug use: Not Currently       Allergies:  No Known Allergies    PCP: None, None    Current Meds:   Current Facility-Administered Medications   Medication Dose Route Frequency Provider Last Rate Last Admin    cefTRIAXone (ROCEPHIN) 500 mg in lidocaine 1 % 1.43 mL IM Injection  500 mg IntraMUSCular NOW Virginia Kim APRN - NP        doxycycline hyclate (VIBRAMYCIN) capsule 100 mg  100 mg Oral NOW Virginia Kim APRN - NP         Current Outpatient Medications   Medication Sig Dispense Refill    doxycycline hyclate (VIBRA-TABS) 100 MG tablet Take 1 tablet by mouth 2 times daily for 7 days 14 tablet 0    ibuprofen (ADVIL;MOTRIN) 600 MG tablet Take 1 tablet by mouth 3 times daily as needed for Pain 30 tablet 0    clotrimazole-betamethasone

## 2024-05-21 NOTE — DISCHARGE INSTRUCTIONS
Please make sure we have your correct number.   1) If your gonorrhea or chlamydia tests are positive and you have not been treated, we will give you a call. If you have been treated in the Emergency department, you will receive a letter with the results.    2) You need to follow up for further STD testing such as HIV, Hepatitis C and syphilis as there is a chance you could have contracted this and we do not test for this in ED. Follow up with the health department for further testing (see locations below).    3) Condoms reduce the risk of catching a sexual related disease.  Please make sure to have protected sex at all times.     For any future STD testing, please go to any of these locations for testing and treatment:    [facility]  [address] [place] [id number]  [phone] [facility]  [address] [place] [address]  [phone]   [facility]  [address] [place] [address]  [phone] [facility]  [address] [place] [address]  [phone]   [facility]  [address] [place] [address]  [phone] [facility]  [address] [place] [address]  [phone]   [facility]  [address] [place] [address]  [phone] [facility]  [address] [place] [address]  [phone]

## 2024-05-23 LAB
C TRACH DNA SPEC QL NAA+PROBE: NEGATIVE
N GONORRHOEA DNA SPEC QL NAA+PROBE: NEGATIVE
SAMPLE TYPE: NORMAL
SERVICE CMNT-IMP: NORMAL
SPECIMEN SOURCE: NORMAL

## 2024-06-03 ENCOUNTER — HOSPITAL ENCOUNTER (EMERGENCY)
Facility: HOSPITAL | Age: 29
Discharge: HOME | End: 2024-06-03
Payer: COMMERCIAL

## 2024-06-03 VITALS
HEART RATE: 60 BPM | TEMPERATURE: 98.8 F | DIASTOLIC BLOOD PRESSURE: 70 MMHG | RESPIRATION RATE: 17 BRPM | SYSTOLIC BLOOD PRESSURE: 126 MMHG | WEIGHT: 185 LBS | OXYGEN SATURATION: 100 % | BODY MASS INDEX: 27.32 KG/M2

## 2024-06-03 DIAGNOSIS — R36.9 PENILE DISCHARGE: Primary | ICD-10-CM

## 2024-06-03 LAB
APPEARANCE UR: ABNORMAL
BACTERIA URNS QL MICRO: NEGATIVE /HPF
BILIRUB UR QL: NEGATIVE
COLOR UR: YELLOW
EPITH CASTS URNS QL MICRO: ABNORMAL /LPF
GLUCOSE UR STRIP.AUTO-MCNC: NEGATIVE MG/DL
HGB UR QL STRIP: NEGATIVE
KETONES UR QL STRIP.AUTO: NEGATIVE MG/DL
LEUKOCYTE ESTERASE UR QL STRIP.AUTO: ABNORMAL
NITRITE UR QL STRIP.AUTO: NEGATIVE
PH UR STRIP: 8 (ref 5–8)
PROT UR STRIP-MCNC: NEGATIVE MG/DL
RBC #/AREA URNS HPF: ABNORMAL /HPF (ref 0–5)
SP GR UR REFRACTOMETRY: 1.01 (ref 1–1.03)
URINE CULTURE IF INDICATED: ABNORMAL
UROBILINOGEN UR QL STRIP.AUTO: 0.1 EU/DL (ref 0.2–1)
WBC URNS QL MICRO: ABNORMAL /HPF (ref 0–4)

## 2024-06-03 PROCEDURE — 6360000002 HC RX W HCPCS

## 2024-06-03 PROCEDURE — 99284 EMERGENCY DEPT VISIT MOD MDM: CPT

## 2024-06-03 PROCEDURE — 96372 THER/PROPH/DIAG INJ SC/IM: CPT

## 2024-06-03 PROCEDURE — 87491 CHLMYD TRACH DNA AMP PROBE: CPT

## 2024-06-03 PROCEDURE — 87591 N.GONORRHOEAE DNA AMP PROB: CPT

## 2024-06-03 PROCEDURE — 81001 URINALYSIS AUTO W/SCOPE: CPT

## 2024-06-03 PROCEDURE — 2580000003 HC RX 258

## 2024-06-03 RX ORDER — DOXYCYCLINE HYCLATE 100 MG
100 TABLET ORAL 2 TIMES DAILY
Qty: 14 TABLET | Refills: 0 | Status: SHIPPED | OUTPATIENT
Start: 2024-06-03 | End: 2024-06-10

## 2024-06-03 RX ADMIN — CEFTRIAXONE SODIUM 500 MG: 500 INJECTION, POWDER, FOR SOLUTION INTRAMUSCULAR; INTRAVENOUS at 18:21

## 2024-06-03 ASSESSMENT — PAIN SCALES - GENERAL: PAINLEVEL_OUTOF10: 4

## 2024-06-03 ASSESSMENT — PAIN - FUNCTIONAL ASSESSMENT: PAIN_FUNCTIONAL_ASSESSMENT: 0-10

## 2024-06-03 NOTE — ED PROVIDER NOTES
Negative mg/dL    Glucose, Ur Negative Negative mg/dL    Ketones, Urine Negative Negative mg/dL    Bilirubin, Urine Negative Negative      Blood, Urine Negative Negative      Urobilinogen, Urine 0.1 (L) 0.2 - 1.0 EU/dL    Nitrite, Urine Negative Negative      Leukocyte Esterase, Urine Small (A) Negative      WBC, UA 5-10 0 - 4 /hpf    RBC, UA 0-5 0 - 5 /hpf    Epithelial Cells, UA Few Few /lpf    BACTERIA, URINE Negative Negative /hpf    Urine Culture if Indicated Culture not indicated by UA result Culture not indicated by UA result         EKG: Not Applicable    Radiologic Studies:  Non-plain film images such as CT, Ultrasound and MRI are read by the radiologist. Plain radiographic images are visualized and preliminarily interpreted by the ED Physician with the following findings: Not Applicable.    Interpretation per the Radiologist below, if available at the time of this note:  No orders to display        ED COURSE and DIFFERENTIAL DIAGNOSIS/MDM   5:47 PM Differential and Considerations:     Patient presents for STD testing with penile discharge.  He would like empiric treatment at this time.  DDx: Gonorrhea, chlamydia, herpes, UTI.  He has no testicular pain, penile lesions.  He declined physical exam.     Records Reviewed (source and summary of external notes): Prior medical records and Nursing notes    Vitals:    Vitals:    06/03/24 1734   BP: 126/70   Pulse: 60   Resp: 17   Temp: 98.8 °F (37.1 °C)   TempSrc: Oral   SpO2: 100%   Weight: 83.9 kg (185 lb)        ED COURSE  ED Course as of 06/03/24 2018 Mon Jun 03, 2024   1807 Leukocyte Esterase, Urine(!): Small [CL]   1807 UA with small leuks, negative for bacteria.  Discussed results with patient, he would like to be empirically treated for an STI.  Patient states that the symptoms seem he did not finish his full course of antibiotics prior to resuming sexual contact.  Discussed with patient that he needs to wait until after he finishes treatment and

## 2024-06-07 LAB
C TRACH DNA SPEC QL NAA+PROBE: NEGATIVE
N GONORRHOEA DNA SPEC QL NAA+PROBE: POSITIVE
SAMPLE TYPE: ABNORMAL
SERVICE CMNT-IMP: ABNORMAL
SPECIMEN SOURCE: ABNORMAL

## 2024-09-15 ENCOUNTER — HOSPITAL ENCOUNTER (EMERGENCY)
Facility: HOSPITAL | Age: 29
Discharge: HOME | End: 2024-09-15
Attending: STUDENT IN AN ORGANIZED HEALTH CARE EDUCATION/TRAINING PROGRAM
Payer: COMMERCIAL

## 2024-09-15 VITALS
HEART RATE: 70 BPM | WEIGHT: 185 LBS | DIASTOLIC BLOOD PRESSURE: 75 MMHG | OXYGEN SATURATION: 100 % | RESPIRATION RATE: 16 BRPM | SYSTOLIC BLOOD PRESSURE: 129 MMHG | HEIGHT: 69 IN | TEMPERATURE: 98.1 F | BODY MASS INDEX: 27.4 KG/M2

## 2024-09-15 DIAGNOSIS — Z20.2 STD EXPOSURE: Primary | ICD-10-CM

## 2024-09-15 PROCEDURE — 87591 N.GONORRHOEAE DNA AMP PROB: CPT

## 2024-09-15 PROCEDURE — 6360000002 HC RX W HCPCS: Performed by: STUDENT IN AN ORGANIZED HEALTH CARE EDUCATION/TRAINING PROGRAM

## 2024-09-15 PROCEDURE — 87491 CHLMYD TRACH DNA AMP PROBE: CPT

## 2024-09-15 PROCEDURE — 2500000003 HC RX 250 WO HCPCS: Performed by: STUDENT IN AN ORGANIZED HEALTH CARE EDUCATION/TRAINING PROGRAM

## 2024-09-15 PROCEDURE — 99284 EMERGENCY DEPT VISIT MOD MDM: CPT

## 2024-09-15 PROCEDURE — 96372 THER/PROPH/DIAG INJ SC/IM: CPT

## 2024-09-15 RX ORDER — DOXYCYCLINE HYCLATE 100 MG
100 TABLET ORAL 2 TIMES DAILY
Qty: 14 TABLET | Refills: 0 | Status: SHIPPED | OUTPATIENT
Start: 2024-09-15 | End: 2024-09-22

## 2024-09-15 RX ADMIN — LIDOCAINE HYDROCHLORIDE 500 MG: 10 INJECTION, SOLUTION EPIDURAL; INFILTRATION; INTRACAUDAL; PERINEURAL at 02:09

## 2024-09-15 ASSESSMENT — PAIN - FUNCTIONAL ASSESSMENT
PAIN_FUNCTIONAL_ASSESSMENT: NONE - DENIES PAIN
PAIN_FUNCTIONAL_ASSESSMENT: NONE - DENIES PAIN
